# Patient Record
Sex: MALE | Race: WHITE | NOT HISPANIC OR LATINO | Employment: OTHER | URBAN - METROPOLITAN AREA
[De-identification: names, ages, dates, MRNs, and addresses within clinical notes are randomized per-mention and may not be internally consistent; named-entity substitution may affect disease eponyms.]

---

## 2017-01-01 ENCOUNTER — TELEPHONE (OUTPATIENT)
Dept: GASTROENTEROLOGY | Facility: CLINIC | Age: 62
End: 2017-01-01

## 2017-01-01 ENCOUNTER — TELEPHONE (OUTPATIENT)
Dept: ENDOSCOPY | Facility: HOSPITAL | Age: 62
End: 2017-01-01

## 2017-01-01 DIAGNOSIS — K86.3 CYST AND PSEUDOCYST OF PANCREAS: Primary | ICD-10-CM

## 2017-01-01 DIAGNOSIS — K86.2 CYST AND PSEUDOCYST OF PANCREAS: Primary | ICD-10-CM

## 2017-04-06 PROBLEM — I20.89 STABLE ANGINA: Status: ACTIVE | Noted: 2017-01-01

## 2017-04-06 PROBLEM — I50.30 HEART FAILURE WITH PRESERVED EJECTION FRACTION, NYHA CLASS II: Status: ACTIVE | Noted: 2017-01-01

## 2017-10-30 PROBLEM — Z87.891 HISTORY OF TOBACCO ABUSE: Status: ACTIVE | Noted: 2017-01-01

## 2017-10-30 PROBLEM — N18.2 CKD (CHRONIC KIDNEY DISEASE), STAGE II: Status: ACTIVE | Noted: 2017-01-01

## 2017-10-30 PROBLEM — I48.20 CHRONIC ATRIAL FIBRILLATION: Status: ACTIVE | Noted: 2017-01-01

## 2017-10-30 PROBLEM — N18.2 CKD (CHRONIC KIDNEY DISEASE), STAGE II: Status: RESOLVED | Noted: 2017-01-01 | Resolved: 2017-01-01

## 2017-10-30 PROBLEM — I51.89 DIASTOLIC DYSFUNCTION: Status: ACTIVE | Noted: 2017-01-01

## 2017-10-30 PROBLEM — K86.89 MASS OF PANCREAS: Status: ACTIVE | Noted: 2017-01-01

## 2017-10-30 PROBLEM — Z79.01 CHRONIC ANTICOAGULATION: Status: ACTIVE | Noted: 2017-01-01

## 2017-11-06 PROBLEM — R10.11 RUQ PAIN: Status: ACTIVE | Noted: 2017-01-01

## 2017-11-15 NOTE — TELEPHONE ENCOUNTER
----- Message from Zuhair Luong MD sent at 11/15/2017 12:59 PM CST -----  Regarding: RE: referral   Need EUS with FNA.  Zuhair Luong MD  ----- Message -----  From: Viviana Randhawa MA  Sent: 11/15/2017  12:53 PM  To: Zuhair Luong MD  Subject: FW: referral                                      Please review  ----- Message -----  From: Rachel Peña LPN  Sent: 11/15/2017  11:43 AM  To: Viviana Randhawa MA  Subject: referral                                         Good afternoon,     Pt was seen today as per MD Ford's note:    US: Cholelithiasis, no cholecystitis.  CBD 0.59.  8 mm pancreatic duct.       CT abdomen:mass like enlargement of the head and mild diliation of the pancreatic duct     A/P 62 year old man with biliary colic.  Dilated pancreatic duct on CT in ED.  certainly concerning for malignancy given mass and weight loss  Cancel cholecystectomy at this time  Will set up for EUS and biopsy of this pancreatic mass  Return clinic after biopsy done for further surgical planning  Will give cipro and flagyl given ct with uncomplicated diverticulitis    Electronically signed by Marcio Ford MD at 11/15/2017 11:30 AM         Pt's number is [phone].     Can you get pt scheduled?     Thanks,  Rachel Peña LPN  [phone] if any questions

## 2017-11-16 NOTE — TELEPHONE ENCOUNTER
Spoke with patient. EUS scheduled for 11/29 at 11a. Reviewed prep instrcution with patient. Mr Zepeda verbalized understanding. Patient is aware that we are awaiting an Ok to hold his Xarelto. He understands that procedure will need to be reschedule if we do not get authorization to hold it.

## 2017-11-16 NOTE — TELEPHONE ENCOUNTER
Dr Fady Nguyễn, we plan to hold Xarelto for 2 days (unless you direct otherwise) prior to Upper EUS/possible FNA scheduled 11/29/17 to evaluate pancreatic cyst.  Thank you.

## 2017-11-16 NOTE — TELEPHONE ENCOUNTER
Spoke with patient about instructions for UEUS scheduled 11/29/17 at 1100.  Instructions mailed.  He will hold Xarelto for 2 days prior to procedure with permission from Dr Fady Nguyễn.

## 2017-12-27 NOTE — TELEPHONE ENCOUNTER
----- Message from Sarbjit Pérez RN sent at 12/27/2017 11:36 AM CST -----  Regarding: Needing EUS and possible FNA with [facility]  Good Morning,    Spoke with pt today in clinic and he is ready to get scheduled for EUS and possible FNA at [facility].    Pt does have clinic follow up with us on 1/3/18.    I instructed pt that he should be expecting a phone call from you in regards to scheduling this procedure. If you try to call tomorrow pt is having a tube change out tomorrow at [facility].    Thanks,    Sarbjit Pérez RN  [phone]

## 2017-12-28 PROBLEM — R18.8 ABDOMINAL FLUID COLLECTION: Status: ACTIVE | Noted: 2017-01-01

## 2017-12-29 NOTE — TELEPHONE ENCOUNTER
Spoke with patient. EUS rescheduled to 1/18 at 9a pending ok to hold Xarelto. Reviewed prep instructions. Mr Zepeda verbalized understanding.

## 2017-12-30 PROBLEM — J18.9 PNEUMONIA DUE TO INFECTIOUS ORGANISM: Status: ACTIVE | Noted: 2017-01-01

## 2017-12-30 PROBLEM — N17.9 AKI (ACUTE KIDNEY INJURY): Status: ACTIVE | Noted: 2017-01-01

## 2017-12-30 PROBLEM — Z90.81 H/O SPLENECTOMY: Status: ACTIVE | Noted: 2017-01-01

## 2018-01-01 ENCOUNTER — TELEPHONE (OUTPATIENT)
Dept: ENDOSCOPY | Facility: HOSPITAL | Age: 63
End: 2018-01-01

## 2018-01-04 PROBLEM — E63.9 INADEQUATE DIETARY ENERGY INTAKE: Status: ACTIVE | Noted: 2018-01-01

## 2018-01-04 NOTE — TELEPHONE ENCOUNTER
Spoke with patient contact, Kamini Fagan, about instructions for UEUS scheduled 1/18/18 at 0900.  Instructions mailed to her address ([address] [place], [place] [id number]) where patient will be staying after d/c from hospital.  He will hold Xarelto for 2 days prior to procedure with permission from Dr Fady Nguyễn and confirmation from Dr Miguelito Peña.

## 2018-01-04 NOTE — TELEPHONE ENCOUNTER
Kamini Fagan, patient emergency contact, called back.  Discussed postponing UEUS until current inpatient issues are addressed.  She will discuss with patient later today.

## 2018-01-04 NOTE — TELEPHONE ENCOUNTER
Received message below and therefore will proceed with UEUS 1/18/18.    MD Viviana Rowe MA Cc: Sarbjit Pérez RN; Yonny Da Silva RN   Caller: Unspecified (Today, 10:44 AM)             Ms. Edis and  Rekha,     This is Dr Peña with General Surgery. I saw your messages about Mr Chung Zepeda in regards to holding his rivaroxaban (XARELTO) prior to his EUS planned 1/18/2018, as well as possible rescheduling the procedure.     It is imperative he gets the EUS to aid in diagnosis and treatment of his pancreatic head mass. The procedure has already been delayed due to surgeries. He should be stable and discharged from the hospital before 1/18/2018. We would like him to hold his Xarelto 2 days prior to the procedure.     Please don't hesitate to call me with any questions     Cell [phone]

## 2018-01-04 NOTE — TELEPHONE ENCOUNTER
Attempted calls to patient/family phones.  Left voicemail with call back number.  UEUS that was scheduled 1/18/18 will need to be rescheduled after patient recovers from current issues being addressed as inpatient.